# Patient Record
(demographics unavailable — no encounter records)

---

## 2025-06-13 NOTE — PROCEDURE
[Anatomical Abnormality] : anatomical abnormality [Anterior rhinoscopy insufficient to account for symptoms] : anterior rhinoscopy insufficient to account for symptoms [Rigid Endoscope] : examined with a rigid endoscope [Congested] : congested [S-Shaped Deviated] : S-shape deviation [Normal] : the paranasal sinuses had no abnormalities
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
## 2025-06-24 NOTE — PROCEDURE
[Anatomical Abnormality] : anatomical abnormality [Anterior rhinoscopy insufficient to account for symptoms] : anterior rhinoscopy insufficient to account for symptoms [Rigid Endoscope] : examined with a rigid endoscope [Normal] : the paranasal sinuses had no abnormalities

## 2025-06-24 NOTE — REASON FOR VISIT
[Subsequent Evaluation] : a subsequent evaluation for [FreeTextEntry2] : s/p closed nasal reduction.

## 2025-06-24 NOTE — HISTORY OF PRESENT ILLNESS
[FreeTextEntry1] : Pt returns today s/p closed nasal reduction. Accompanied by father.  Reports that he is doing well. Breathing well. No signs of bleeding.  No further complaints or concerns.